# Patient Record
Sex: MALE | Race: OTHER | HISPANIC OR LATINO | URBAN - METROPOLITAN AREA
[De-identification: names, ages, dates, MRNs, and addresses within clinical notes are randomized per-mention and may not be internally consistent; named-entity substitution may affect disease eponyms.]

---

## 2021-12-19 ENCOUNTER — EMERGENCY (EMERGENCY)
Facility: HOSPITAL | Age: 36
LOS: 1 days | Discharge: HOME | End: 2021-12-19
Attending: EMERGENCY MEDICINE | Admitting: EMERGENCY MEDICINE
Payer: MEDICARE

## 2021-12-19 VITALS
OXYGEN SATURATION: 98 % | HEART RATE: 93 BPM | SYSTOLIC BLOOD PRESSURE: 131 MMHG | RESPIRATION RATE: 16 BRPM | DIASTOLIC BLOOD PRESSURE: 83 MMHG | TEMPERATURE: 99 F

## 2021-12-19 LAB
ALBUMIN SERPL ELPH-MCNC: 4.6 G/DL — SIGNIFICANT CHANGE UP (ref 3.3–5)
ALP SERPL-CCNC: 87 U/L — SIGNIFICANT CHANGE UP (ref 40–120)
ALT FLD-CCNC: 18 U/L — SIGNIFICANT CHANGE UP (ref 4–41)
ANION GAP SERPL CALC-SCNC: 11 MMOL/L — SIGNIFICANT CHANGE UP (ref 7–14)
AST SERPL-CCNC: 18 U/L — SIGNIFICANT CHANGE UP (ref 4–40)
BASOPHILS # BLD AUTO: 0.05 K/UL — SIGNIFICANT CHANGE UP (ref 0–0.2)
BASOPHILS NFR BLD AUTO: 0.5 % — SIGNIFICANT CHANGE UP (ref 0–2)
BILIRUB SERPL-MCNC: <0.2 MG/DL — SIGNIFICANT CHANGE UP (ref 0.2–1.2)
BUN SERPL-MCNC: 24 MG/DL — HIGH (ref 7–23)
CALCIUM SERPL-MCNC: 8.9 MG/DL — SIGNIFICANT CHANGE UP (ref 8.4–10.5)
CHLORIDE SERPL-SCNC: 111 MMOL/L — HIGH (ref 98–107)
CO2 SERPL-SCNC: 15 MMOL/L — LOW (ref 22–31)
CREAT SERPL-MCNC: 1.08 MG/DL — SIGNIFICANT CHANGE UP (ref 0.5–1.3)
EOSINOPHIL # BLD AUTO: 0.27 K/UL — SIGNIFICANT CHANGE UP (ref 0–0.5)
EOSINOPHIL NFR BLD AUTO: 2.5 % — SIGNIFICANT CHANGE UP (ref 0–6)
GLUCOSE SERPL-MCNC: 100 MG/DL — HIGH (ref 70–99)
HCT VFR BLD CALC: 44.2 % — SIGNIFICANT CHANGE UP (ref 39–50)
HGB BLD-MCNC: 15 G/DL — SIGNIFICANT CHANGE UP (ref 13–17)
IANC: 6.16 K/UL — SIGNIFICANT CHANGE UP (ref 1.5–8.5)
IMM GRANULOCYTES NFR BLD AUTO: 0.2 % — SIGNIFICANT CHANGE UP (ref 0–1.5)
LYMPHOCYTES # BLD AUTO: 3.56 K/UL — HIGH (ref 1–3.3)
LYMPHOCYTES # BLD AUTO: 32.8 % — SIGNIFICANT CHANGE UP (ref 13–44)
MCHC RBC-ENTMCNC: 30.7 PG — SIGNIFICANT CHANGE UP (ref 27–34)
MCHC RBC-ENTMCNC: 33.9 GM/DL — SIGNIFICANT CHANGE UP (ref 32–36)
MCV RBC AUTO: 90.6 FL — SIGNIFICANT CHANGE UP (ref 80–100)
MONOCYTES # BLD AUTO: 0.8 K/UL — SIGNIFICANT CHANGE UP (ref 0–0.9)
MONOCYTES NFR BLD AUTO: 7.4 % — SIGNIFICANT CHANGE UP (ref 2–14)
NEUTROPHILS # BLD AUTO: 6.16 K/UL — SIGNIFICANT CHANGE UP (ref 1.8–7.4)
NEUTROPHILS NFR BLD AUTO: 56.6 % — SIGNIFICANT CHANGE UP (ref 43–77)
NRBC # BLD: 0 /100 WBCS — SIGNIFICANT CHANGE UP
NRBC # FLD: 0 K/UL — SIGNIFICANT CHANGE UP
PLATELET # BLD AUTO: 262 K/UL — SIGNIFICANT CHANGE UP (ref 150–400)
POTASSIUM SERPL-MCNC: 3.5 MMOL/L — SIGNIFICANT CHANGE UP (ref 3.5–5.3)
POTASSIUM SERPL-SCNC: 3.5 MMOL/L — SIGNIFICANT CHANGE UP (ref 3.5–5.3)
PROT SERPL-MCNC: 7.3 G/DL — SIGNIFICANT CHANGE UP (ref 6–8.3)
RBC # BLD: 4.88 M/UL — SIGNIFICANT CHANGE UP (ref 4.2–5.8)
RBC # FLD: 12.1 % — SIGNIFICANT CHANGE UP (ref 10.3–14.5)
SODIUM SERPL-SCNC: 137 MMOL/L — SIGNIFICANT CHANGE UP (ref 135–145)
WBC # BLD: 10.86 K/UL — HIGH (ref 3.8–10.5)
WBC # FLD AUTO: 10.86 K/UL — HIGH (ref 3.8–10.5)

## 2021-12-19 PROCEDURE — 99218: CPT

## 2021-12-19 RX ORDER — ACETAZOLAMIDE 250 MG/1
500 TABLET ORAL ONCE
Refills: 0 | Status: DISCONTINUED | OUTPATIENT
Start: 2021-12-19 | End: 2021-12-19

## 2021-12-19 RX ORDER — ACETAZOLAMIDE 250 MG/1
500 TABLET ORAL ONCE
Refills: 0 | Status: COMPLETED | OUTPATIENT
Start: 2021-12-19 | End: 2021-12-19

## 2021-12-19 RX ORDER — TIMOLOL 0.5 %
1 DROPS OPHTHALMIC (EYE) ONCE
Refills: 0 | Status: COMPLETED | OUTPATIENT
Start: 2021-12-19 | End: 2021-12-19

## 2021-12-19 RX ORDER — BRIMONIDINE TARTRATE 2 MG/MG
1 SOLUTION/ DROPS OPHTHALMIC ONCE
Refills: 0 | Status: COMPLETED | OUTPATIENT
Start: 2021-12-19 | End: 2021-12-19

## 2021-12-19 RX ORDER — LATANOPROST 0.05 MG/ML
1 SOLUTION/ DROPS OPHTHALMIC; TOPICAL ONCE
Refills: 0 | Status: COMPLETED | OUTPATIENT
Start: 2021-12-19 | End: 2021-12-19

## 2021-12-19 RX ORDER — DORZOLAMIDE HYDROCHLORIDE 20 MG/ML
1 SOLUTION/ DROPS OPHTHALMIC ONCE
Refills: 0 | Status: COMPLETED | OUTPATIENT
Start: 2021-12-19 | End: 2021-12-19

## 2021-12-19 RX ADMIN — DORZOLAMIDE HYDROCHLORIDE 1 DROP(S): 20 SOLUTION/ DROPS OPHTHALMIC at 19:20

## 2021-12-19 RX ADMIN — Medication 1 DROP(S): at 19:20

## 2021-12-19 RX ADMIN — LATANOPROST 1 DROP(S): 0.05 SOLUTION/ DROPS OPHTHALMIC; TOPICAL at 19:20

## 2021-12-19 RX ADMIN — BRIMONIDINE TARTRATE 1 DROP(S): 2 SOLUTION/ DROPS OPHTHALMIC at 19:20

## 2021-12-19 RX ADMIN — ACETAZOLAMIDE 500 MILLIGRAM(S): 250 TABLET ORAL at 19:55

## 2021-12-19 NOTE — ED CDU PROVIDER INITIAL DAY NOTE - DETAILS
#[id number]  Patient 36 y.o male with no known PMHx who presents to ED c/o Rt eye vision loss x 3 weeks. Pt initially seen at outside hospital and dx with acute angle closure glaucoma and was advised to have surgery but patient never followed up due to insurance. Comes to our ED for worsening vision loss and eye pain. IOP noted to be 50. labs normal. Ophtho evaluated patient; diamox, brimonidine, cosopt, xalatan and timilol given. IOP unchanged. Ophtho recs appreciated. Pt pending CTA's. Transferred to CDU for MRI's.
 #[id number]  Patient 36 y.o male with no known PMHx who presents to ED c/o Rt eye vision loss x 3 weeks.  Pt initially seen at outside hospital last tuesday and dx with acute angle closure glaucoma and was advised to have surgery but patient never followed up due to insurance. Pt was given Rx for IOP lowering eye drops which he states he has been compliant with.  Comes to our ED now for continued worsening vision loss and eye pain. IOP noted to be 50. labs normal. Ophtho evaluated patient; diamox, brimonidine, cosopt, xalatan and timilol given. IOP unchanged. Ophtho recs appreciated. Pt pending CTA's. Transferred to CDU for MRI's

## 2021-12-19 NOTE — ED CDU PROVIDER INITIAL DAY NOTE - ATTENDING CONTRIBUTION TO CARE
Attending Statement: I have reviewed and agree with all pertinent clinical information, including history and physical exam and agree with treatment plan of the PA, except as noted.  36yoM denies sig pmhx pw right eye pain and blurred vision for 3 weeks. on 12-6-21 was evaluated at OSH told to have glaucoma, elevated IOP, 'need surgery" pt refused. Given eye drops and advised close optho followup. pt presents today with worsening right eye pain and right sided headache.   no n/v/d. No vision from right eye x 3 weeks. no trauma. no numbness no AC.  Vital signs noted. sitting up, opthalmology at bedside examining pt. No resp distress. able to speak in full and clear sentences. no wheeze, rales or stridor. soft nontender abdomen. no  rebound. no guarding. no sign of trauma. no CVAT ambulatory  plan optho following, ct angio head/neck, MRI

## 2021-12-19 NOTE — CONSULT NOTE ADULT - SUBJECTIVE AND OBJECTIVE BOX
[facility] DEPARTMENT OF OPHTHALMOLOGY - INITIAL ADULT CONSULT  -----------------------------------------------------------------------------------------------------------------  Maxwell Heath MD PGY 3  Pager: [phone]  -----------------------------------------------------------------------------------------------------------------    HPI: As per ED   [id number] Ana: 36 year old male no pmhx Frisian speaking presents to the ED with pain to R eye with blurry vision x 3 weeks. patient was seen in ED in [place] on Tuesday was advised he needs emergent surgery but reports did not have insurance so he left. patient was given 3x different eye drops and PO acetazolamide which patient reports being compliant with. patient reports he is unsure but reports he was in the process of being checked if he is DM. patient reports blurry vision to eye, flashing lights, photophobia. patient reports coming to ED today as pain has worsened. patient denies trauma or injury to eye.    Interval History: Ophthalmology consulted for Acute angle closure glaucoma . History as above. Patient reports declining vision for 3 weeks of right eye. Was seen at hospital in [place], was found to have elevated IOP to 32 and was told would need eye injection and emergent surgery as an outpatient. The patient left the hospital prior to full work up . the patient reports significant pain in the right eye with associated redness and decreased vision. The pain has been increasing with time. There are no symptoms in the Left eye. The patient was given All IOP lowering drops and Acetazolamide at previous hospital, which the patient reports adhering too.     Pt. Denies  flashes or floater, and double vision.     PAST MEDICAL & SURGICAL HISTORY:    Past Ocular History: None prior to diagnosis of NVG at previous [facility]    Family History:  FAMILY HISTORY:      Social History: Patient reports not having insurance     Ophthalmic Medications: Xal 1,0  Cosopt 2,0, Brim 3,0 - Diamox 250 BID    MEDICATIONS  (STANDING):    MEDICATIONS  (PRN):    Allergies & Intolerances:   NKDA     Review of Systems:  Constitutional: No fever, chills  Eyes: As Above  Neuro: No tremors  Cardiovascular: No chest pain, palpitations  Respiratory: No SOB, no cough  GI: No nausea, vomiting, abdominal pain  : No dysuria  Skin: no rash  Psych: no depression  Endocrine: no polyuria, polydipsia  Heme/lymph: no swelling    VITALS: T(C): 37 (12-19-21 @ 16:01)  T(F): 98.6 (12-19-21 @ 16:01), Max: 98.6 (12-19-21 @ 16:01)  HR: 93 (12-19-21 @ 16:01) (93 - 93)  BP: 131/83 (12-19-21 @ 16:01) (131/83 - 131/83)  RR:  (16 - 16)  SpO2:  (98% - 98%)  Wt(kg): --  General: AAO x 3, appropriate mood and affect    Ophthalmology Exam:  Visual acuity (sc): 20/50 - OD 20/20- OS  Pupils: PERRL OU, no APD  Tonometry: 47, 16  Extraocular movements (EOMs): Full OU, no pain, no diplopia.  Confrontational Visual Field (CVF): Full OU.  Color Plates: 11/12 OD 12/12 OS    Pen Light Exam (PLE)  External: Normal OU.  Lids/Lashes/Lacrimal Ducts: Flat OU.  Sclera/Conjunctiva: White and quiet OU.  Cornea: Clear OU.  Anterior Chamber: Deep and formed OU.    Iris: Flat OU.  Lens: Clear OU.    Fundus Exam: dilated with 1% tropicamide and 2.5% phenylephrine  Approval obtained from primary team for dilation  Patient aware that pupils can remained dilated for at least 4-6 hours.  Exam performed with 20 D lens    Vitreous: PVD OD OU  Disc, cup/disc: peripapillary edema, exudates sharp and pink, 0.4 OU  Macula: wnl OU  Vessels: wnl OU  Periphery: wnl OU    Labs/Imaging:  ***   [facility] DEPARTMENT OF OPHTHALMOLOGY - INITIAL ADULT CONSULT  -----------------------------------------------------------------------------------------------------------------  Maxwell Heath MD PGY 3  Pager: [phone]  -----------------------------------------------------------------------------------------------------------------    HPI: As per ED   [id number] Ana: 36 year old male no pmhx Frisian speaking presents to the ED with pain to R eye with blurry vision x 3 weeks. patient was seen in ED in [place] on Tuesday was advised he needs emergent surgery but reports did not have insurance so he left. patient was given 3x different eye drops and PO acetazolamide which patient reports being compliant with. patient reports he is unsure but reports he was in the process of being checked if he is DM. patient reports blurry vision to eye, flashing lights, photophobia. patient reports coming to ED today as pain has worsened. patient denies trauma or injury to eye.    Interval History: Ophthalmology consulted for Acute angle closure glaucoma . History as above. Patient reports declining vision for 3 weeks of right eye. Was seen at hospital in [place], was found to have elevated IOP to 32 and was told would need eye injection and emergent surgery as an outpatient. The patient left the hospital prior to full work up . the patient reports significant pain in the right eye with associated redness and decreased vision. The pain has been increasing with time. There are no symptoms in the Left eye. The patient was given All IOP lowering drops and Acetazolamide at previous hospital, which the patient reports adhering too.     Pt. Denies  flashes or floater, and double vision.     PAST MEDICAL & SURGICAL HISTORY:    Past Ocular History: None prior to diagnosis of NVG at previous [facility]    Family History:  FAMILY HISTORY:      Social History: Patient reports not having insurance     Ophthalmic Medications: Xal 1,0  Cosopt 2,0, Brim 3,0 - Diamox 250 BID    MEDICATIONS  (STANDING):    MEDICATIONS  (PRN):    Allergies & Intolerances:   NKDA     Review of Systems:  Constitutional: No fever, chills  Eyes: As Above  Neuro: No tremors  Cardiovascular: No chest pain, palpitations  Respiratory: No SOB, no cough  GI: No nausea, vomiting, abdominal pain  : No dysuria  Skin: no rash  Psych: no depression  Endocrine: no polyuria, polydipsia  Heme/lymph: no swelling    VITALS: T(C): 37 (12-19-21 @ 16:01)  T(F): 98.6 (12-19-21 @ 16:01), Max: 98.6 (12-19-21 @ 16:01)  HR: 93 (12-19-21 @ 16:01) (93 - 93)  BP: 131/83 (12-19-21 @ 16:01) (131/83 - 131/83)  RR:  (16 - 16)  SpO2:  (98% - 98%)  Wt(kg): --  General: AAO x 3, appropriate mood and affect    Ophthalmology Exam:  Visual acuity (sc): 20/50 - OD 20/20- OS  Pupils: Minimal reactivity OD Round reactive OS , no APD by reverse  Tonometry: 47, 16  Extraocular movements (EOMs): Full OU, no pain, no diplopia.  Confrontational Visual Field (CVF): Full OU.  Color Plates: 11/12 OD 12/12 OS    Pen Light Exam (PLE)  External: Normal OU.  Lids/Lashes/Lacrimal Ducts: Flat OU.  Sclera/Conjunctiva: 1+ Injection OD Tr inj OS.  Cornea: Microcystic edema OD Clear OS.  Anterior Chamber: Deep and formed OU.    Iris: NVI OD Flat No NVI OS.  Lens: NS OU.    Gonioscopy - Scattered areas with visible structures other wise PAS~360 with NVA OD Open  no NVA OS    Fundus Exam: dilated with 1% tropicamide and 2.5% phenylephrine  Approval obtained from primary team for dilation  Patient aware that pupils can remained dilated for at least 4-6 hours.  Exam performed with 20 D lens    Vitreous: PVD Vit heme collecting inferiorly OD Syneresis OS No gross NVD  OU  Disc, cup/disc: peripapillary exudates sharp and pink, 0.4 OD Sharp and pink .4 OS  Macula: Macular Exudates. IRH OD>OS  Vessels: Sclerotic vessels noted OD>OS  Periphery: IRH some areas suspicious foe NVE - No gross Fibrovascular bundles OD>OS    Labs/Imaging:

## 2021-12-19 NOTE — ED ADULT TRIAGE NOTE - CHIEF COMPLAINT QUOTE
Pt. c/o right eye pain, redness and blurry vision x 3 wks. Seen at another hospital in [place] and prescribed medications. Pt. states he's had no improvement.

## 2021-12-19 NOTE — ED PROVIDER NOTE - OBJECTIVE STATEMENT
36 year old male Sami spealing presents to the ED with pain to R eye with blurry vision. patient was seen in ED in [place] on Tuesday  [id number] Ana: 36 year old male no pmhx Italian speaking presents to the ED with pain to R eye with blurry vision x 3 weeks. patient was seen in ED in [place] on Tuesday was advised he needs emergent surgery but reports did not have insurance so he left. patient was given 3x different eye drops and PO acetazolamide which patient reports being compliant with. patient reports he is unsure but reports he was in the process of being checked if he is DM. patient reports blurry vision to eye, flashing lights, photophobia. patient reports coming to ED today as pain has worsened. patient denies trauma or injury to eye.

## 2021-12-19 NOTE — ED PROVIDER NOTE - CLINICAL SUMMARY MEDICAL DECISION MAKING FREE TEXT BOX
36 year old male no pmhx Guinean speaking presents to the ED with pain to R eye with blurry vision x 3 weeks. concern for acute closed angle glaucoma, opthalmology urgent consult, BW, PCR swab,  Meds PO and opthalmic, reassess, pending Optho reccs

## 2021-12-19 NOTE — CONSULT NOTE ADULT - ASSESSMENT
Assessment and Recommendations:  36y male with a past medical history/ocular history of *** consulted for ***, found to have ***    # Neovascular Glaucoma - Right eye  - Unclear etiology at this time - Possible diabetic retinopathy vs vascular event   - Unknown diabetic history   - IOP elevated on arrival - Started of IOP lowering drops   - Recommend 1 dose of 500 Diamox IV as long as not contraindicated medically  - Recommend CBC, CMP, A1C, Lipid   - Medical optimization   - Will monitor    Outpatient Follow-up: Patient should follow-up with his/her ophthalmologist or with [facility] Department of Ophthalmology within 1 week of after discharge at:    [address]  [place] [address]  [phone]    Maxwell Heath MD, PGY-3  Pager: [phone]  Also available on [employer] Teams Assessment and Recommendations:  36y male with no known past medical history/ocular history presents to ED with 3 weeks blurry vision right eye, with significant pain this week, worsening past 3 days already on IOP lowering drops and oral diamox consulted for acute angle closure, found to have Neovascular Glaucoma likely due to Proliferative Diabetic retinopathy .     # Neovascular Glaucoma - Right eye  - Unclear etiology at this time - Possible diabetic retinopathy vs vascular event   - Unknown diabetic history - Glucose 100 -  A1C Pending   - IOP elevated on arrival - Started of IOP lowering drops   - Recommend 1 dose of 500 Diamox IV as long as not contraindicated medically  - IOP still elevated - slightly improved at 38 - Will need Avastin injection and possible surgery to control IOP   - Continue Xalatan 1,0., cosopt 2,0 Brim 3,0 and Diamox 500 BID  - Recommend CBC, CMP, A1C, Lipid   - Medical optimization - BP within normal limits   - Can discharge from ophthalmology point of view with close follow up tomorrow morning at [facility] as below  - Discussed at length with patient who agrees with plan and follow up.     SDW Dr. Brandy George (Glaucoma) and Dr. Christiansen (Retina)     Outpatient Follow-up: Patient should follow-up Tomorrow at below:    [facility]  [address]   4[address] [place] [address]  [phone] [address]  [place] [address]  [phone]    Maxwell Heath MD, PGY-3  Pager: [phone]  Also available on [employer] Teams Assessment and Recommendations:  36y male with no known past medical history/ocular history presents to ED with 3 weeks blurry vision right eye, with significant pain this week, worsening past 3 days already on IOP lowering drops and oral diamox consulted for acute angle closure, found to have Neovascular Glaucoma OD with appearance of bilateral neovascular retinopathy     # Neovascular Glaucoma - Right eye  - Unclear etiology at this time - Possible diabetic retinopathy (Less likely at this time due to negative work up)  vs other source of Retinal ischemia (carotid ischemia, underlying retinal disease, such as Eales Disease, causing Neovascularization) Bilateral Retinal vascular event   - Unknown diabetic history - Glucose 100 -  A1C 5.9  - IOP elevated on arrival - Started of IOP lowering drops s/p IV diamox 500 x1  - IOP still elevated - Will need Avastin injection and possible surgery to control IOP   - Continue Xalatan 1,0., cosopt 2,0 Brim 3,0 and Diamox 500 BID  - Recommend Lipid panel   - Medical optimization - BP within normal limits   - Can discharge from ophthalmology point of view with close follow up tomorrow morning at [facility] as below  - Will plan on continuing work up of retinal disease at [facility] with OCT imaging and Fundus Angiography tomorrow. Can obtain imaging and blood work at [facility] as well.   - Discussed at length with patient who agrees with plan and follow up.     SDW Dr. Brandy George (Glaucoma) and Dr. Christiansen (Retina)     Outpatient Follow-up: Patient should follow-up Tomorrow at below:    [facility]  [address]   [address] [place] [address]  [phone] [address]  [place] [address]  [phone]    Maxwell Heath MD, PGY-3  Pager: [phone]  Also available on [employer] Teams

## 2021-12-19 NOTE — ED PROVIDER NOTE - PROGRESS NOTE DETAILS
Opthalmology urgent consult for concern for closed angle glaucoma md shyam Welch PA-C pt laying down no distress. Spoke with opthalmology, saw pt w the glaucoma specialist. Recommend A1C and will wait for recommendation after they contact the retina specialist.   Last pressure was unchanged, would be ok for discharge. Spoke with opthalmology, pending recommendation. Spoke with opthalmology, pending recommendation. pt states pain is better. vision unchanged. pt pending final recommendation from opthalmology, will sign out to night team plan ct angio head/neck. Place in CDU for MRI/MRA. JOANNE pt.

## 2021-12-19 NOTE — ED CDU PROVIDER INITIAL DAY NOTE - MEDICAL DECISION MAKING DETAILS
#[id number]  Patient 36 y.o male with no known PMHx who presents to ED c/o Rt eye vision loss x 3 weeks. Pt initially seen at outside hospital and dx with acute angle closure glaucoma and was advised to have surgery but patient never followed up due to insurance. Comes to our ED for worsening vision loss and eye pain. IOP noted to be 50. labs normal. Ophtho evaluated patient; diamox, brimonidine, cosopt, xalatan and timilol given. IOP unchanged. Ophtho recs appreciated. Pt pending CTA's. Transferred to CDU for MRI's.
 #[id number]  Patient 36 y.o male with no known PMHx who presents to ED c/o Rt eye vision loss x 3 weeks.  Pt initially seen at outside hospital last tuesday and dx with acute angle closure glaucoma and was advised to have surgery but patient never followed up due to insurance. Pt was given Rx for IOP lowering eye drops which he states he has been compliant with.  Comes to our ED now for continued worsening vision loss and eye pain. IOP noted to be 50. labs normal. Ophtho evaluated patient; diamox, brimonidine, cosopt, xalatan and timilol given. IOP unchanged. Ophtho recs appreciated. Pt pending CTA's. Transferred to CDU for MRI's

## 2021-12-19 NOTE — ED PROVIDER NOTE - ATTENDING CONTRIBUTION TO CARE
Attending Statement: I have reviewed and agree with all pertinent clinical information, including history and physical exam and agree with treatment plan of the PA, except as noted.   used.  36yoM denies sig pmhx pw right eye pain and blurred vision for 3 weeks. on 12-6-21 was evaluated at OSH told to have glaucoma, elevated IOP, 'need surgery" pt refused. Given eye drops and advised close optho followup. pt presents today with worsening right eye pain and right sided headache.   no n/v/d. No vision from right eye x 3 weeks. no trauma. no numbness no AC.  Vital signs noted. sitting up, ---  plan optho consult, meds, monitor IOP . labs, Attending Statement: I have reviewed and agree with all pertinent clinical information, including history and physical exam and agree with treatment plan of the PA, except as noted.   used.  36yoM denies sig pmhx pw right eye pain and blurred vision for 3 weeks. on 12-6-21 was evaluated at OSH told to have glaucoma, elevated IOP, 'need surgery" pt refused. Given eye drops and advised close optho followup. pt presents today with worsening right eye pain and right sided headache.   no n/v/d. No vision from right eye x 3 weeks. no trauma. no numbness no AC.  Vital signs noted. sitting up, opthalmology at bedside examining pt. No resp distress. able to speak in full and clear sentences. no wheeze, rales or stridor. soft nontender abdomen. no  rebound. no guarding. no sign of trauma. no CVAT ambulatory  plan optho consult, meds, monitor IOP . labs,   Given sx have been for >3weeks up, less concern for stroke.

## 2021-12-19 NOTE — ED CDU PROVIDER INITIAL DAY NOTE - OBJECTIVE STATEMENT
#[id number]  Patient 36 y.o male with no known PMHx who presents to ED c/o Rt eye vision loss x 3 weeks. Pt states that 3 weeks ago he was having intermittent Rt eye visual changes described as blurry vision with floaters. However over past week vision loss worsened with increasing Rt eye pain. Pt initially seen at outside hospital last tuesday and dx with acute angle closure glaucoma and was advised to have surgery but patient never followed up due to insurance. Pt was given Rx for IOP lowering eye drops which he states he has been compliant with.  Comes to our ED now for continued worsening vision loss and eye pain. IOP noted to be 50. labs normal. Ophtho evaluated patient; diamox, brimonidine, cosopt, xalatan and timilol given. IOP unchanged. Ophtho recs appreciated. Pt pending CTA's. Transferred to CDU for MRI's. Pt denies n/v/d, numbness or tingling, weakness, trauma/falls, dizziness, Lt eye complaints, contact use or glasses, neck pain, fevers, chills or any other complaints.
 #[id number]  Patient 36 y.o male with no known PMHx who presents to ED c/o Rt eye vision loss x 3 weeks. Pt states that 3 weeks ago he was having intermittent Rt eye visual changes described as blurry vision with floaters. However over past week vision loss worsened with increasing Rt eye pain. Pt initially seen at outside hospital last tuesday and dx with acute angle closure glaucoma and was advised to have surgery but patient never followed up due to insurance. Pt was given Rx for IOP lowering eye drops which he states he has been compliant with.  Comes to our ED now for continued worsening vision loss and eye pain. IOP noted to be 50. labs normal. Ophtho evaluated patient; diamox, brimonidine, cosopt, xalatan and timilol given. IOP unchanged. Ophtho recs appreciated. Pt pending CTA's. Transferred to CDU for MRI's. Pt denies n/v/d, numbness or tingling, weakness, trauma/falls, dizziness, Lt eye complaints, contact use or glasses, neck pain, fevers, chills or any other complaints.

## 2021-12-19 NOTE — ED CDU PROVIDER INITIAL DAY NOTE - PHYSICAL EXAMINATION
Vital signs reviewed.   CONSTITUTIONAL: Well-appearing; well-nourished; in no apparent distress. Non-toxic appearing.   HEAD: Normocephalic, atraumatic.  EYES:  Pupils dilated. EOM intact, conjunctiva and sclera WNL. Refer to Ophtho note.   NECK/LYMPH: Supple; non-tender  CARD: Normal S1, S2; no murmurs, rubs, or gallops noted.  RESP: Normal chest excursion with respiration; breath sounds clear and equal bilaterally; no wheezes, rhonchi, or rales.  EXT/MS: moves all extremities; distal pulses are normal, no pedal edema.  SKIN: Normal for age and race  NEURO: Awake, alert, oriented x 3, no gross deficits, no motor or sensory deficit noted.  PSYCH: Normal mood; appropriate affect.
Vital signs reviewed.   CONSTITUTIONAL: Well-appearing; well-nourished; in no apparent distress. Non-toxic appearing.   HEAD: Normocephalic, atraumatic.  EYES: +Rt eye dilated s/p ophtho exam. Refer to ophtho note for detailed exam. Pt notes +Rt eye blurry vision. Lt eye vision clear. EOM intact.   ENT: normal nose; no rhinorrhea  CARD: Normal S1, S2;   RESP: breath sounds clear and equal bilaterally; no wheezes, rhonchi, or rales.  ABD/GI: soft, non-distended; non-tender; no palpable organomegaly, no pulsatile mass.  EXT/MS: moves all extremities; distal pulses are normal, no pedal edema.  SKIN: Normal for age and race; warm; dry; good turgor; no apparent lesions or exudate noted.  NEURO: Awake, alert, oriented x 3, no gross deficits, no motor or sensory deficit noted.  PSYCH: Normal mood; appropriate affect.

## 2021-12-19 NOTE — ED ADULT NURSE NOTE - OBJECTIVE STATEMENT
Pt alert ox3 came in c/o blurry vision and severe eye pain. was seen by opthalmology with elevated intra ocular pressure. meds given by opthalmology. labs done as ordered. meds given as ordered. pt awaiting for re eval by opthalmology. pt states he can greer better.

## 2021-12-20 VITALS
SYSTOLIC BLOOD PRESSURE: 111 MMHG | OXYGEN SATURATION: 100 % | TEMPERATURE: 98 F | DIASTOLIC BLOOD PRESSURE: 55 MMHG | RESPIRATION RATE: 15 BRPM | HEART RATE: 64 BPM

## 2021-12-20 LAB — SARS-COV-2 RNA SPEC QL NAA+PROBE: SIGNIFICANT CHANGE UP

## 2021-12-20 PROCEDURE — G1004: CPT

## 2021-12-20 PROCEDURE — 70498 CT ANGIOGRAPHY NECK: CPT | Mod: 26,MG

## 2021-12-20 PROCEDURE — 70496 CT ANGIOGRAPHY HEAD: CPT | Mod: 26,MG

## 2021-12-20 PROCEDURE — 99217: CPT

## 2021-12-20 PROCEDURE — 70543 MRI ORBT/FAC/NCK W/O &W/DYE: CPT | Mod: 26,ME

## 2021-12-20 PROCEDURE — 70553 MRI BRAIN STEM W/O & W/DYE: CPT | Mod: 26,MG

## 2021-12-20 RX ORDER — LATANOPROST 0.05 MG/ML
1 SOLUTION/ DROPS OPHTHALMIC; TOPICAL DAILY
Refills: 0 | Status: DISCONTINUED | OUTPATIENT
Start: 2021-12-19 | End: 2021-12-23

## 2021-12-20 RX ORDER — DORZOLAMIDE HYDROCHLORIDE TIMOLOL MALEATE 20; 5 MG/ML; MG/ML
1 SOLUTION/ DROPS OPHTHALMIC EVERY 12 HOURS
Refills: 0 | Status: DISCONTINUED | OUTPATIENT
Start: 2021-12-19 | End: 2021-12-23

## 2021-12-20 RX ORDER — BRIMONIDINE TARTRATE 2 MG/MG
1 SOLUTION/ DROPS OPHTHALMIC THREE TIMES A DAY
Refills: 0 | Status: DISCONTINUED | OUTPATIENT
Start: 2021-12-20 | End: 2021-12-23

## 2021-12-20 RX ORDER — ACETAMINOPHEN 500 MG
975 TABLET ORAL ONCE
Refills: 0 | Status: COMPLETED | OUTPATIENT
Start: 2021-12-20 | End: 2021-12-20

## 2021-12-20 RX ORDER — ACETAZOLAMIDE 250 MG/1
500 TABLET ORAL
Refills: 0 | Status: DISCONTINUED | OUTPATIENT
Start: 2021-12-20 | End: 2021-12-23

## 2021-12-20 RX ORDER — ACETAMINOPHEN 500 MG
650 TABLET ORAL ONCE
Refills: 0 | Status: COMPLETED | OUTPATIENT
Start: 2021-12-20 | End: 2021-12-20

## 2021-12-20 RX ADMIN — DORZOLAMIDE HYDROCHLORIDE TIMOLOL MALEATE 1 DROP(S): 20; 5 SOLUTION/ DROPS OPHTHALMIC at 07:43

## 2021-12-20 RX ADMIN — Medication 975 MILLIGRAM(S): at 09:42

## 2021-12-20 RX ADMIN — ACETAZOLAMIDE 500 MILLIGRAM(S): 250 TABLET ORAL at 07:43

## 2021-12-20 RX ADMIN — BRIMONIDINE TARTRATE 1 DROP(S): 2 SOLUTION/ DROPS OPHTHALMIC at 07:43

## 2021-12-20 RX ADMIN — Medication 650 MILLIGRAM(S): at 05:55

## 2021-12-20 NOTE — ED CDU PROVIDER SUBSEQUENT DAY NOTE - PROGRESS NOTE DETAILS
Patient MRI pre-segovia negative for acute findings. CTA H/N normal. Plan to f.u offical MRI report and patient to follow up with ophtho. CTA and MRI neg for acute pathology. Will dc pt and send to [facility] ASAP today.

## 2021-12-20 NOTE — ED CDU PROVIDER DISPOSITION NOTE - CLINICAL COURSE
35 y/o male no pmh c/o R eye pain and vision changes x3 weeks. Pt was dx w/ acute angle closure glaucoma at outside hospital last week but left due to insurance issues. Pt was taking drops as prescribed but did not improve. Pt then presented to [facility], was eval by optho and started on IV diamox and multiple eye drops. Pt was placed in the CDU and had negative CTA head and neck and MRI. Pt to be dc and sent straight to Ophtho clinic at [facility] for retinal specialist.

## 2021-12-20 NOTE — ED CDU PROVIDER SUBSEQUENT DAY NOTE - MEDICAL DECISION MAKING DETAILS
#[id number]  Patient 36 y.o male with no known PMHx who presents to ED c/o Rt eye vision loss x 3 weeks.  Pt initially seen at outside hospital last tuesday and dx with acute angle closure glaucoma and was advised to have surgery but patient never followed up due to insurance. Pt was given Rx for IOP lowering eye drops which he states he has been compliant with.  Comes to our ED now for continued worsening vision loss and eye pain. IOP noted to be 50. labs normal. Ophtho evaluated patient; diamox, brimonidine, cosopt, xalatan and timilol given. IOP unchanged. Ophtho recs appreciated. Pt pending CTA's. Transferred to CDU for MRI's    Plan to follow up official MRI report and patient to be dc'd to [facility] clinic to follow up with ophtho today.

## 2021-12-20 NOTE — ED CDU PROVIDER DISPOSITION NOTE - PATIENT PORTAL LINK FT
You can access the FollowMyHealth Patient Portal offered by [facility] by registering at the following website: [email or web address]. By joining [employer]’s FollowMyHealth portal, you will also be able to view your health information using other applications (apps) compatible with our system.

## 2021-12-20 NOTE — ED CDU PROVIDER SUBSEQUENT DAY NOTE - ATTENDING CONTRIBUTION TO CARE
MRIs performed- WNL. Pt still having mild headache. Will d/c to f/u in ophtho clinic today for further management.

## 2021-12-20 NOTE — ED CDU PROVIDER SUBSEQUENT DAY NOTE - HISTORY
#[id number]  Patient 36 y.o male with no known PMHx who presents to ED c/o Rt eye vision loss x 3 weeks.  Pt initially seen at outside hospital last tuesday and dx with acute angle closure glaucoma and was advised to have surgery but patient never followed up due to insurance. Pt was given Rx for IOP lowering eye drops which he states he has been compliant with.  Comes to our ED now for continued worsening vision loss and eye pain. IOP noted to be 50. labs normal. Ophtho evaluated patient; diamox, brimonidine, cosopt, xalatan and timilol given. IOP unchanged. Ophtho recs appreciated. Pt pending CTA's. Transferred to CDU for MRI's    While in CDU patient still having some eye pain. CTA's normal. MRI official report pending.

## 2021-12-20 NOTE — ED CDU PROVIDER SUBSEQUENT DAY NOTE - PHYSICAL EXAMINATION
Vital signs reviewed.   CONSTITUTIONAL: Well-appearing; well-nourished; in no apparent distress. Non-toxic appearing.   HEAD: Normocephalic, atraumatic.  EYES: +Rt eye dilated s/p ophtho exam. Refer to ophtho note for detailed exam. Pt notes +Rt eye blurry vision. Lt eye vision clear. EOM intact.   ENT: normal nose; no rhinorrhea  CARD: Normal S1, S2;   RESP: breath sounds clear and equal bilaterally; no wheezes, rhonchi, or rales.  ABD/GI: soft, non-distended; non-tender; no palpable organomegaly, no pulsatile mass.  EXT/MS: moves all extremities; distal pulses are normal, no pedal edema.  SKIN: Normal for age and race; warm; dry; good turgor; no apparent lesions or exudate noted.  NEURO: Awake, alert, oriented x 3, no gross deficits, no motor or sensory deficit noted.  PSYCH: Normal mood; appropriate affect.